# Patient Record
Sex: FEMALE | Race: WHITE
[De-identification: names, ages, dates, MRNs, and addresses within clinical notes are randomized per-mention and may not be internally consistent; named-entity substitution may affect disease eponyms.]

---

## 2017-10-02 ENCOUNTER — HOSPITAL ENCOUNTER (OUTPATIENT)
Dept: HOSPITAL 61 - MAMMO | Age: 62
Discharge: HOME | End: 2017-10-02
Attending: OBSTETRICS & GYNECOLOGY
Payer: COMMERCIAL

## 2017-10-02 DIAGNOSIS — Z12.31: Primary | ICD-10-CM

## 2017-10-02 NOTE — RAD
DATE: 10/2/2017



EXAM: DIGITAL SCREEN BILAT W/CAD



HISTORY: Routine screening



COMPARISON: 9/30/2016



This study was interpreted with the benefit of Computerized Aided Detection

(CAD).





The breast parenchyma shows scattered fibroglandular densities. Breast

parenchyma level B.



FINDINGS: No new or enlarging breast densities are seen. Minimal benign type

calcifications are present. No suspicious microcalcifications have developed. 







IMPRESSION: Stable mammograms without evidence of malignancy.





BI-RADS CATEGORY: 2 BENIGN FINDING(S)



RECOMMENDED FOLLOW-UP: 12M 12 MONTH FOLLOW-UP



PQRS compliance statement: Patient information was entered into a reminder

system with a target due date     for the next mammogram.



Mammography is a sensitive method for finding small breast cancers, but it

does not detect them all and is not a substitute for careful clinical

examination.  A negative mammogram does not negate a clinically suspicious

finding and should not result in delay in biopsying a clinically suspicious

abnormality.



"Our facility is accredited by the [employer] Mammography

Program."

## 2018-10-03 ENCOUNTER — HOSPITAL ENCOUNTER (OUTPATIENT)
Dept: HOSPITAL 61 - MAMMO | Age: 63
Discharge: HOME | End: 2018-10-03
Attending: OBSTETRICS & GYNECOLOGY
Payer: COMMERCIAL

## 2018-10-03 DIAGNOSIS — Z12.31: Primary | ICD-10-CM

## 2018-10-03 PROCEDURE — 77063 BREAST TOMOSYNTHESIS BI: CPT

## 2018-10-03 PROCEDURE — 77067 SCR MAMMO BI INCL CAD: CPT

## 2019-10-07 ENCOUNTER — HOSPITAL ENCOUNTER (OUTPATIENT)
Dept: HOSPITAL 61 - MAMMO | Age: 64
Discharge: HOME | End: 2019-10-07
Attending: FAMILY MEDICINE
Payer: COMMERCIAL

## 2019-10-07 DIAGNOSIS — Z12.31: Primary | ICD-10-CM

## 2019-10-07 DIAGNOSIS — N64.89: ICD-10-CM

## 2019-10-07 PROCEDURE — 77067 SCR MAMMO BI INCL CAD: CPT

## 2019-10-07 PROCEDURE — 77063 BREAST TOMOSYNTHESIS BI: CPT

## 2019-10-08 NOTE — RAD
DATE: 10/7/2019



EXAM: MAMMO MAX SCREENING BILATERAL



HISTORY: Asymptomatic screening mammogram



COMPARISON: 9/30/2016, 10/2/2017, 10/3/2018



This study was interpreted with the benefit of Computerized Aided Detection

(CAD).





Breast Density:  SCATTERED The breast parenchyma shows scattered

fibroglandular densities. Breast parenchyma level B.





FINDINGS: Bilateral CC and MLO views of the breasts were performed. Bilateral

breast tomosynthesis was performed in CC and MLO projections.



Right breast: There are no suspicious microcalcifications, masses or areas of

architectural distortion.



Left breast: There are no suspicious microcalcifications, masses or areas of

architectural distortion.



Findings are stable from prior mammogram.     





IMPRESSION: Negative bilateral mammogram.







BI-RADS CATEGORY: 1 NEGATIVE



RECOMMENDED FOLLOW-UP: 12M 12 MONTH FOLLOW-UP



PQRS compliance statement: Patient information was entered into a reminder

system with a target due date 10/7/2020 for the next mammogram.



Mammography is a sensitive method for finding small breast cancers, but it

does not detect them all and is not a substitute for careful clinical

examination.  A negative mammogram does not negate a clinically suspicious

finding and should not result in delay in biopsying a clinically suspicious

abnormality.



"Our facility is accredited by the [employer] Mammography

Program."

## 2020-10-08 ENCOUNTER — HOSPITAL ENCOUNTER (OUTPATIENT)
Dept: HOSPITAL 61 - MAMMO | Age: 65
End: 2020-10-08
Attending: FAMILY MEDICINE
Payer: COMMERCIAL

## 2020-10-08 DIAGNOSIS — Z12.31: Primary | ICD-10-CM

## 2020-10-08 PROCEDURE — 77067 SCR MAMMO BI INCL CAD: CPT

## 2020-10-08 PROCEDURE — 77063 BREAST TOMOSYNTHESIS BI: CPT

## 2020-10-08 NOTE — RAD
DATE: 10/8/2020 9:00 AM



EXAM: DIGITAL SCREEN BILAT W/CAD



HISTORY: screening examination



COMPARISON: October 7, 2019



Bilateral CC and MLO views of the breasts were performed. Bilateral breast 
tomosynthesis was performed in CC and MLO projections.



This study was interpreted with the benefit of Computerized Aided Detection 
(CAD).





FINDINGS:



Breast Density: SCATTERED The breast parenchyma shows scattered fibroglandular 
densities. Breast parenchyma level B





No suspicious masses, microcalcifications or architectural distortion is present
to suggest malignancy in either breast.





The visualized axillae are unremarkable. 



IMPRESSION: No mammographic evidence of malignancy. 



BI-RADS CATEGORY: 1 NEGATIVE



RECOMMENDED FOLLOW-UP: Annual screening mammography is recommended, unless 
clinically indicated sooner based on symptoms or change in physical exam.



PQRS compliance statement: Patient information was entered into a reminder 
system with a target due date 12 months for the next mammogram.



Mammography is a sensitive method for finding small breast cancers, but it does 
not detect them all and is not a substitute for careful clinical examination. A 
negative mammogram does not negate a clinically suspicious finding and should 
not result in delay in biopsying a clinically suspicious abnormality.



"Our facility is accredited by the [employer] Mammography 
Program."

ROSSID

## 2021-10-11 ENCOUNTER — HOSPITAL ENCOUNTER (OUTPATIENT)
Dept: HOSPITAL 61 - MAMMO | Age: 66
End: 2021-10-11
Attending: FAMILY MEDICINE
Payer: COMMERCIAL

## 2021-10-11 DIAGNOSIS — Z12.31: Primary | ICD-10-CM

## 2021-10-11 PROCEDURE — 77067 SCR MAMMO BI INCL CAD: CPT

## 2021-10-11 PROCEDURE — 77063 BREAST TOMOSYNTHESIS BI: CPT

## 2021-10-11 NOTE — RAD
Bilateral digital screening 2-D and 3-D (digital breast tomosynthesis) mammogram:



Reason for examination: Routine screening.



Comparison:  Mammogram 22 from the 10/8/2020.



Interpretation was made with the benefit of CAD.



FINDINGS:

Breast density: Category B. There are scattered areas of fibroglandular density.



No suspicious breast mass, malignant appearing calcifications, or architectural distortion is seen.



IMPRESSION:

No evidence of malignancy.



Assessment: BI-RADS 1. Negative.



Recommendation: Routine screening mammograms.



The patient will receive a letter with the results in the mail. Patient information will be entered i
nto the mammography reminder system with a target recall date for the next mammogram. A reminder chantel
er will be generated.



Electronically signed by: Shea Smalls MD (10/11/2021 2:21 PM) [id number]
